# Patient Record
Sex: MALE
[De-identification: names, ages, dates, MRNs, and addresses within clinical notes are randomized per-mention and may not be internally consistent; named-entity substitution may affect disease eponyms.]

---

## 2024-01-23 ENCOUNTER — APPOINTMENT (OUTPATIENT)
Dept: ORTHOPEDIC SURGERY | Facility: CLINIC | Age: 39
End: 2024-01-23
Payer: COMMERCIAL

## 2024-01-23 DIAGNOSIS — S93.439A SPRAIN OF TIBIOFIBULAR LIGAMENT OF UNSPECIFIED ANKLE, INITIAL ENCOUNTER: ICD-10-CM

## 2024-01-23 PROBLEM — Z00.00 ENCOUNTER FOR PREVENTIVE HEALTH EXAMINATION: Status: ACTIVE | Noted: 2024-01-23

## 2024-01-23 PROCEDURE — 73610 X-RAY EXAM OF ANKLE: CPT | Mod: RT

## 2024-01-23 PROCEDURE — 99203 OFFICE O/P NEW LOW 30 MIN: CPT

## 2024-01-23 NOTE — HISTORY OF PRESENT ILLNESS
[de-identified] : 01/23/2024: Patient is a 38-year-old male presenting for evaluation of right ankle after injuring it yesterday while playing basketball.  Notes that he felt a popping sensation at the time.  He used an ankle brace and ice with some improvement of symptoms.  He has been able to walk with some discomfort.  States history of sprains in the past.  He has some pain with walking on stairs.  He denies much swelling, weakness, paresthesias

## 2024-01-23 NOTE — IMAGING
[de-identified] : R ankle/foot: - No obvious ankle or foot deformity - Pain over anterior ankle  - No pain with palpation of achilles, medial or lateral malleoli, base of 5th metatarsal, navicular, metatarsals, or digits - ROM intact throughout ankle dorsiflexion, plantarflexion, inversion, eversion. Toes with normal flexion and extension. - 5/5 strength throughout. Pain with inversion and eversion - pain with ankle squeeze and Kleigers - Negative anterior drawer, talar tilt - Negative calcaneal and metatarsal squeeze - Distally neurovascularly intact    [Right] : right ankle [There are no fractures, subluxations or dislocations. No significant abnormalities are seen] : There are no fractures, subluxations or dislocations. No significant abnormalities are seen

## 2024-01-23 NOTE — ASSESSMENT
[FreeTextEntry1] : Symptoms of High Ankle Sprain following basketball injury yesterday. XR without signs of obvious fracture - Recommend lace up ankle brace given not much pain in shoe - Rest, ice, compression, elevation - Naproxen BID as needed - PT referral - Follow up in 1 months

## 2024-01-23 NOTE — PHYSICAL EXAM
[de-identified] : Constitutional: Well developed, well nourished, able to communicate Neuro: Normal sensation, No focal deficits Skin: Intact CV: Peripheral vascular exam grossly normal Pulm: No signs of respiratory distress Psych: Oriented, normal mood and affect

## 2024-02-20 ENCOUNTER — APPOINTMENT (OUTPATIENT)
Dept: ORTHOPEDIC SURGERY | Facility: CLINIC | Age: 39
End: 2024-02-20
Payer: COMMERCIAL

## 2024-02-20 DIAGNOSIS — S93.431A SPRAIN OF TIBIOFIBULAR LIGAMENT OF RIGHT ANKLE, INITIAL ENCOUNTER: ICD-10-CM

## 2024-02-20 PROCEDURE — 99213 OFFICE O/P EST LOW 20 MIN: CPT

## 2024-02-20 NOTE — HISTORY OF PRESENT ILLNESS
[de-identified] : 01/23/2024: Patient is a 38-year-old male presenting for evaluation of right ankle after injuring it yesterday while playing basketball.  Notes that he felt a popping sensation at the time.  He used an ankle brace and ice with some improvement of symptoms.  He has been able to walk with some discomfort.  States history of sprains in the past.  He has some pain with walking on stairs.  He denies much swelling, weakness, paresthesias  2/20/2024: Patient is here to follow up on right ankle. Patient has been using lace up ankle brace but hasn't done physical therapy. Patient notes improvement in the right ankle but still experiences pain and soreness especially when continuing basketball and sometimes walking on stairs. Otherwise, no new symptoms or injuries.

## 2024-02-20 NOTE — PHYSICAL EXAM
[de-identified] : Constitutional: Well developed, well nourished, able to communicate Neuro: Normal sensation, No focal deficits Skin: Intact CV: Peripheral vascular exam grossly normal Pulm: No signs of respiratory distress Psych: Oriented, normal mood and affect

## 2024-02-20 NOTE — ASSESSMENT
[FreeTextEntry1] : Symptoms of High Ankle Sprain following basketball injury 1/22/24. XR without signs of obvious fracture last visit. improving.  - Given HEP to start as he has not done any PT - Advised caution with basketball since he still has some discomfort - Continue lace up ankle brace with activities for 6 months post injury - Rest, ice, compression, elevation - Naproxen as needed - Follow up as needed

## 2024-02-20 NOTE — IMAGING
[Right] : right ankle [There are no fractures, subluxations or dislocations. No significant abnormalities are seen] : There are no fractures, subluxations or dislocations. No significant abnormalities are seen [de-identified] : R ankle/foot: - No obvious ankle or foot deformity - Mild Pain over anterior ankle  - No pain with palpation of achilles, medial or lateral malleoli, base of 5th metatarsal, navicular, metatarsals, or digits - ROM intact throughout ankle dorsiflexion, plantarflexion, inversion, eversion. Toes with normal flexion and extension. - 5/5 strength throughout. Pain with inversion and eversion - No pain with ankle squeeze. Mild pain with Kleigers - Negative anterior drawer, talar tilt - Negative calcaneal and metatarsal squeeze - Distally neurovascularly intact